# Patient Record
Sex: MALE | Race: WHITE | Employment: FULL TIME | URBAN - METROPOLITAN AREA
[De-identification: names, ages, dates, MRNs, and addresses within clinical notes are randomized per-mention and may not be internally consistent; named-entity substitution may affect disease eponyms.]

---

## 2024-07-12 ENCOUNTER — OFFICE VISIT (OUTPATIENT)
Dept: PRIMARY CARE | Facility: CLINIC | Age: 34
End: 2024-07-12
Payer: COMMERCIAL

## 2024-07-12 VITALS
SYSTOLIC BLOOD PRESSURE: 150 MMHG | OXYGEN SATURATION: 98 % | WEIGHT: 282 LBS | DIASTOLIC BLOOD PRESSURE: 90 MMHG | HEART RATE: 104 BPM | TEMPERATURE: 97 F | BODY MASS INDEX: 35.25 KG/M2

## 2024-07-12 DIAGNOSIS — G25.0 FAMILIAL TREMOR: ICD-10-CM

## 2024-07-12 DIAGNOSIS — F11.20 OPIOID TYPE DEPENDENCE, CONTINUOUS (MULTI): ICD-10-CM

## 2024-07-12 DIAGNOSIS — D22.4 MELANOCYTIC NEVI OF SCALP AND NECK: ICD-10-CM

## 2024-07-12 DIAGNOSIS — G43.109 MIGRAINE WITH AURA AND WITHOUT STATUS MIGRAINOSUS, NOT INTRACTABLE: ICD-10-CM

## 2024-07-12 DIAGNOSIS — N50.89 TESTICULAR SWELLING, RIGHT: Primary | ICD-10-CM

## 2024-07-12 DIAGNOSIS — Z00.00 HEALTHCARE MAINTENANCE: ICD-10-CM

## 2024-07-12 PROBLEM — F17.200 CURRENT SMOKER: Status: RESOLVED | Noted: 2018-05-10 | Resolved: 2024-07-12

## 2024-07-12 PROCEDURE — 99204 OFFICE O/P NEW MOD 45 MIN: CPT | Performed by: STUDENT IN AN ORGANIZED HEALTH CARE EDUCATION/TRAINING PROGRAM

## 2024-07-12 RX ORDER — BUPRENORPHINE AND NALOXONE 8; 2 MG/1; MG/1
1 FILM, SOLUBLE BUCCAL; SUBLINGUAL DAILY
COMMUNITY

## 2024-07-12 ASSESSMENT — ENCOUNTER SYMPTOMS
HEMATURIA: 0
CHILLS: 0
DIZZINESS: 0
SHORTNESS OF BREATH: 0
DIFFICULTY URINATING: 0
VOMITING: 0
FREQUENCY: 0
NAUSEA: 0
FLANK PAIN: 0
FEVER: 0
DYSURIA: 0
CONSTIPATION: 0
ABDOMINAL PAIN: 0
LIGHT-HEADEDNESS: 0
COUGH: 0
FATIGUE: 0
HEADACHES: 0
DIARRHEA: 0

## 2024-07-12 NOTE — PATIENT INSTRUCTIONS
Thank you for coming in and getting established.    With some of the symptoms you have been having, I went ahead and ordered an ultrasound of the scrotum with Dopplers.  This can also check on the blood flow as well as to make sure there are no masses or any other explanation for some of the swelling you have been having.  You should be getting a call to get this scheduled.  If you are having issues, please call our office back and we can assist with getting scheduled for this.  They can perform the ultrasound downstairs and the lab but this would need to get scheduled.    I also submitted orders for lab work.  Please get this done prior to your next appointment with me at least a week or 2 ahead of time.  Labs will need to be fasting for about 10 to 12 hours before.  There is a lab located downstairs on the first floor and they are open from about 6:30 AM to about 4:30 PM.    We also can be trying to get you in a referral to dermatology.  Please give us a call if you do not hear from their office over the next 1 to 2 weeks.  I am also going to have you get really established with neurology as well.  We will try having you see a different neurologist than the one you had previously seen.  Please call again in the next 1 to 2 weeks if you do not hear from their office about getting set up for an appointment.    You can get scheduled for a wellness visit with me before you leave the office here today.    Please call with any additional questions or concerns.    Thank you

## 2024-07-12 NOTE — PROGRESS NOTES
Subjective   Patient ID: Guillermo Hatch is a 33 y.o. male who presents for Groin Swelling (X Feb).    HPI     He has had an issue with his right testicle being bigger or looking differently than his left side.  No pain in the testicle itself. No other urinary symptoms.  The swelling seems to come and go.  No urinary changes. No rash or other lesions in the area or discharge.  No precipitating event that he is aware of.    He does have white coat hypertension.  His blood pressure and heart rate at home are both normal.    Review of Systems   Constitutional:  Negative for chills, fatigue and fever.   Respiratory:  Negative for cough and shortness of breath.    Cardiovascular:  Negative for chest pain.   Gastrointestinal:  Negative for abdominal pain, constipation, diarrhea, nausea and vomiting.   Genitourinary:  Positive for scrotal swelling (right). Negative for decreased urine volume, difficulty urinating, dysuria, enuresis, flank pain, frequency, genital sores, hematuria, penile discharge, penile pain, penile swelling, testicular pain and urgency.   Neurological:  Negative for dizziness, light-headedness and headaches.       Objective   /90   Pulse 104   Temp 36.1 °C (97 °F)   Wt 128 kg (282 lb)   SpO2 98%   BMI 35.25 kg/m²     Physical Exam  Vitals and nursing note reviewed.   Constitutional:       General: He is not in acute distress.     Appearance: Normal appearance. He is normal weight. He is not ill-appearing or toxic-appearing.   HENT:      Head: Normocephalic and atraumatic.   Cardiovascular:      Rate and Rhythm: Normal rate and regular rhythm.      Heart sounds: Normal heart sounds.   Pulmonary:      Effort: Pulmonary effort is normal.      Breath sounds: Normal breath sounds.   Genitourinary:     Penis: Normal.       Testes: Normal.   Neurological:      Mental Status: He is alert.         Assessment/Plan   Problem List Items Addressed This Visit             ICD-10-CM    Opioid type  dependence, continuous (Multi) F11.20     Follows with Reji Wing for regular Suboxone. Overall doing well.         Migraine with aura and without status migrainosus, not intractable G43.109    Relevant Orders    CBC and Auto Differential    Comprehensive Metabolic Panel    Lipid Panel    TSH with reflex to Free T4 if abnormal    Urinalysis with Reflex Microscopic    Vitamin D 25-Hydroxy,Total (for eval of Vitamin D levels)    Referral to Neurology    Familial tremor G25.0    Relevant Orders    CBC and Auto Differential    Comprehensive Metabolic Panel    TSH with reflex to Free T4 if abnormal    Referral to Neurology    Melanocytic nevi of scalp and neck D22.4    Relevant Orders    Referral to Dermatology     Other Visit Diagnoses         Codes    Testicular swelling, right    -  Primary N50.89    Relevant Orders    US scrotum w doppler    Healthcare maintenance     Z00.00    Relevant Orders    CBC and Auto Differential    Comprehensive Metabolic Panel    Lipid Panel    TSH with reflex to Free T4 if abnormal    Urinalysis with Reflex Microscopic    Vitamin D 25-Hydroxy,Total (for eval of Vitamin D levels)          History and physical examination as above.  Patient presenting to establish care.  Physical examination otherwise unremarkable.  Patient is seeming to have swelling within the right testicle.  Discussed options for further follow-up with the patient.  He would like to move forward with imaging.  Ultrasound of the scrotum with Dopplers ordered and we will contact the patient with the results.  Orders for lab work submitted for the patient.  He will get this done fasting prior to his next ointment.  We will go over the results further at that time.  Referral placed over to dermatology with his history of multiple moles.  He would like regular skin checks.  Referral also placed to neurology for persistent and previous history of recurring headaches and migraines.  Plan for wellness care overall  in the next month or 2.  Patient will call sooner with other acute concerns or complaints.

## 2024-10-10 ENCOUNTER — APPOINTMENT (OUTPATIENT)
Dept: PRIMARY CARE | Facility: CLINIC | Age: 34
End: 2024-10-10
Payer: COMMERCIAL

## 2024-11-11 ENCOUNTER — APPOINTMENT (OUTPATIENT)
Dept: PRIMARY CARE | Facility: CLINIC | Age: 34
End: 2024-11-11
Payer: COMMERCIAL

## 2024-11-11 VITALS
DIASTOLIC BLOOD PRESSURE: 82 MMHG | WEIGHT: 280 LBS | BODY MASS INDEX: 34.1 KG/M2 | HEART RATE: 92 BPM | TEMPERATURE: 97.1 F | SYSTOLIC BLOOD PRESSURE: 142 MMHG | HEIGHT: 76 IN | OXYGEN SATURATION: 99 %

## 2024-11-11 DIAGNOSIS — F11.20 OPIOID TYPE DEPENDENCE, CONTINUOUS (MULTI): ICD-10-CM

## 2024-11-11 DIAGNOSIS — K76.0 STEATOSIS OF LIVER: ICD-10-CM

## 2024-11-11 DIAGNOSIS — G25.0 FAMILIAL TREMOR: ICD-10-CM

## 2024-11-11 DIAGNOSIS — Z00.00 ENCOUNTER FOR ROUTINE HISTORY AND PHYSICAL EXAM FOR MALE: Primary | ICD-10-CM

## 2024-11-11 DIAGNOSIS — R06.83 LOUD SNORING: ICD-10-CM

## 2024-11-11 DIAGNOSIS — G43.109 MIGRAINE WITH AURA AND WITHOUT STATUS MIGRAINOSUS, NOT INTRACTABLE: ICD-10-CM

## 2024-11-11 DIAGNOSIS — N50.89 TESTICLE SWELLING: ICD-10-CM

## 2024-11-11 DIAGNOSIS — D22.60 MELANOCYTIC NEVI OF UNSPECIFIED UPPER LIMB, INCLUDING SHOULDER: ICD-10-CM

## 2024-11-11 DIAGNOSIS — R79.89 ABNORMAL LIVER FUNCTION TESTS: ICD-10-CM

## 2024-11-11 PROBLEM — D22.4 MELANOCYTIC NEVI OF SCALP AND NECK: Status: RESOLVED | Noted: 2019-03-20 | Resolved: 2024-11-11

## 2024-11-11 PROBLEM — F41.1 GENERALIZED ANXIETY DISORDER: Status: ACTIVE | Noted: 2017-01-31

## 2024-11-11 PROCEDURE — 99395 PREV VISIT EST AGE 18-39: CPT | Performed by: FAMILY MEDICINE

## 2024-11-11 PROCEDURE — 93000 ELECTROCARDIOGRAM COMPLETE: CPT | Performed by: FAMILY MEDICINE

## 2024-11-11 PROCEDURE — 3008F BODY MASS INDEX DOCD: CPT | Performed by: FAMILY MEDICINE

## 2024-11-11 PROCEDURE — 1036F TOBACCO NON-USER: CPT | Performed by: FAMILY MEDICINE

## 2024-11-11 ASSESSMENT — PATIENT HEALTH QUESTIONNAIRE - PHQ9
10. IF YOU CHECKED OFF ANY PROBLEMS, HOW DIFFICULT HAVE THESE PROBLEMS MADE IT FOR YOU TO DO YOUR WORK, TAKE CARE OF THINGS AT HOME, OR GET ALONG WITH OTHER PEOPLE: NOT DIFFICULT AT ALL
2. FEELING DOWN, DEPRESSED OR HOPELESS: NOT AT ALL
SUM OF ALL RESPONSES TO PHQ9 QUESTIONS 1 AND 2: 0
1. LITTLE INTEREST OR PLEASURE IN DOING THINGS: NOT AT ALL

## 2024-11-11 ASSESSMENT — ENCOUNTER SYMPTOMS
STRIDOR: 0
FACIAL ASYMMETRY: 0
ANAL BLEEDING: 0
ABDOMINAL PAIN: 0
DYSPHORIC MOOD: 0
CONFUSION: 0
POLYDIPSIA: 0
LIGHT-HEADEDNESS: 0
EYE DISCHARGE: 0
SINUS PAIN: 0
SEIZURES: 0
COUGH: 0
DECREASED CONCENTRATION: 0
GASTROINTESTINAL NEGATIVE: 1
TREMORS: 1
EYE ITCHING: 0
AGITATION: 0
HEMATURIA: 0
EYE PAIN: 0
OCCASIONAL FEELINGS OF UNSTEADINESS: 0
SINUS PRESSURE: 0
NAUSEA: 0
DYSURIA: 0
BRUISES/BLEEDS EASILY: 0
ACTIVITY CHANGE: 0
FATIGUE: 0
CARDIOVASCULAR NEGATIVE: 1
COLOR CHANGE: 0
VOMITING: 0
DEPRESSION: 0
CONSTIPATION: 0
CHEST TIGHTNESS: 0
DIZZINESS: 0
BLOOD IN STOOL: 0
DIFFICULTY URINATING: 0
MYALGIAS: 0
WOUND: 0
NUMBNESS: 0
ABDOMINAL DISTENTION: 0
VOICE CHANGE: 0
HEADACHES: 0
CHOKING: 0
LOSS OF SENSATION IN FEET: 0
TROUBLE SWALLOWING: 0
DIAPHORESIS: 0
EYE REDNESS: 0
PALPITATIONS: 0
SPEECH DIFFICULTY: 0
FLANK PAIN: 0
SORE THROAT: 0
HYPERACTIVE: 0
RHINORRHEA: 0
CHILLS: 0
UNEXPECTED WEIGHT CHANGE: 0
BACK PAIN: 0
DIARRHEA: 0
FREQUENCY: 0
SLEEP DISTURBANCE: 0
ARTHRALGIAS: 0
RESPIRATORY NEGATIVE: 1
ADENOPATHY: 0
APPETITE CHANGE: 0

## 2024-11-11 ASSESSMENT — COLUMBIA-SUICIDE SEVERITY RATING SCALE - C-SSRS
2. HAVE YOU ACTUALLY HAD ANY THOUGHTS OF KILLING YOURSELF?: NO
6. HAVE YOU EVER DONE ANYTHING, STARTED TO DO ANYTHING, OR PREPARED TO DO ANYTHING TO END YOUR LIFE?: NO
1. IN THE PAST MONTH, HAVE YOU WISHED YOU WERE DEAD OR WISHED YOU COULD GO TO SLEEP AND NOT WAKE UP?: NO

## 2024-11-11 NOTE — PATIENT INSTRUCTIONS
Congratulations on staying substance free.    May benefit from beta-blocker therapy recommend discussing this with your neurologist.  This might help with anxiety might help with the heart rate and blood pressure.    Going to asked you to bring in her blood pressure cuff from home to check it against ours.  Findings in office today are likely associated with whitecoat phenomenon.    Checking sleep study because of heavy snoring.    EKG performed and reviewed.    Labs have been ordered please have these performed.    Ultrasound of testicles performed because of swelling that you have noticed on the right side.    Will await the lab studies and make recommendations after that.  Once again, would like for you to bring in your blood pressure cuff to check it against ours

## 2024-11-11 NOTE — PROGRESS NOTES
Subjective   Patient ID: Guillermo Hatch is a 33 y.o. male who presents for Annual Exam.    Patient presents for physical exam.    Patient has been told he has heavy snoring feels like he sometimes will wake up at night with some shortness of breath and then it gets better.  Concerned about possible sleep apnea.  Patient continues with with neurology.    Patient is weaning off medicine for his opioid dependence.  States he has been great he is 8 years without opioids.    Patient around other people in social situations visiting doctors usually the heart rate will go up and blood pressure will elevate.  At home is pulse rate is about 80 and blood pressure readings are in the 130s over low 80s to 85.    He has had no troubles with migraines at this time has had no troubles with double vision or blurring vision.  Recently evaluated for for some swelling the right testicle had an ultrasound ordered but he did not follow through with it he states that things feel actually better at this time.    Patient had no troubles with chest pain or shortness of breath.  He is exercising twice weekly.  Trying to follow dietary guidelines closely.  He does have history of hepatic steatosis    75-90.  135/85.         Alcohol intake: none  Caffeine intake: none  Exercise: hiking twice a week.  Planks.    Last Colonoscopy: N/A  Last Pap smear: N/A  Mammogram:N/A  Last Dexa scan:N/A    Shingles vaccine: N/A  TdaP vaccine:     Review of Systems   Constitutional:  Negative for activity change, appetite change, chills, diaphoresis, fatigue and unexpected weight change.   HENT: Negative.  Negative for congestion, dental problem, ear discharge, ear pain, hearing loss, mouth sores, nosebleeds, postnasal drip, rhinorrhea, sinus pressure, sinus pain, sore throat, tinnitus, trouble swallowing and voice change.    Eyes:  Negative for pain, discharge, redness, itching and visual disturbance.        Seeing eye specilaist   Respiratory: Negative.   "Negative for cough, choking, chest tightness and stridor.         Heavy snoring    Cardiovascular: Negative.  Negative for chest pain, palpitations and leg swelling.   Gastrointestinal: Negative.  Negative for abdominal distention, abdominal pain, anal bleeding, blood in stool, constipation, diarrhea, nausea and vomiting.   Endocrine: Negative for cold intolerance, heat intolerance, polydipsia and polyuria.   Genitourinary:  Negative for difficulty urinating, dysuria, enuresis, flank pain, frequency, hematuria, penile pain, scrotal swelling and testicular pain.        Has noted some swelling of the right testicle   Musculoskeletal:  Negative for arthralgias, back pain and myalgias.   Skin:  Negative for color change, rash and wound.   Allergic/Immunologic: Negative for environmental allergies, food allergies and immunocompromised state.   Neurological:  Positive for tremors. Negative for dizziness, seizures, facial asymmetry, speech difficulty, light-headedness, numbness and headaches.   Hematological:  Negative for adenopathy. Does not bruise/bleed easily.   Psychiatric/Behavioral:  Negative for agitation, behavioral problems, confusion, decreased concentration, dysphoric mood, self-injury, sleep disturbance and suicidal ideas. The patient is not hyperactive. Nervous/anxious: seeing counselorstable.       Objective   /82   Pulse (!) 114   Temp 36.2 °C (97.1 °F)   Ht 1.918 m (6' 3.5\")   Wt 127 kg (280 lb)   SpO2 99%   BMI 34.54 kg/m²   BSA Body surface area is 2.6 meters squared.      Physical Exam  Constitutional:       General: He is not in acute distress.     Appearance: Normal appearance. He is not ill-appearing or toxic-appearing.   HENT:      Head: Normocephalic.      Right Ear: Tympanic membrane normal.      Left Ear: Tympanic membrane normal.      Nose: Nose normal.      Mouth/Throat:      Mouth: Mucous membranes are dry.   Eyes:      Extraocular Movements: Extraocular movements intact.      " Conjunctiva/sclera: Conjunctivae normal.      Pupils: Pupils are equal, round, and reactive to light.   Cardiovascular:      Rate and Rhythm: Regular rhythm. Tachycardia present.      Pulses: Normal pulses.      Heart sounds: Normal heart sounds.   Pulmonary:      Effort: Pulmonary effort is normal.      Breath sounds: Normal breath sounds. No wheezing or rhonchi.   Abdominal:      General: Abdomen is flat. Bowel sounds are normal. There is no distension.      Palpations: Abdomen is soft.      Tenderness: There is no abdominal tenderness. There is no right CVA tenderness or rebound.      Hernia: No hernia is present.   Genitourinary:     Penis: Normal.    Musculoskeletal:         General: Normal range of motion.      Cervical back: Normal range of motion. No rigidity.      Right lower leg: No edema.   Skin:     General: Skin is warm and dry.      Capillary Refill: Capillary refill takes less than 2 seconds.      Findings: No bruising.   Neurological:      General: No focal deficit present.      Mental Status: He is alert and oriented to person, place, and time.      Cranial Nerves: No cranial nerve deficit.      Sensory: No sensory deficit.      Motor: No weakness.      Coordination: Coordination normal.      Gait: Gait normal.      Deep Tendon Reflexes: Reflexes normal.      Comments: Fine tremor noted   Psychiatric:         Mood and Affect: Mood normal.         Behavior: Behavior normal.         Thought Content: Thought content normal.       No visits with results within 1 Year(s) from this visit.   Latest known visit with results is:   [facility] Encounter on 12/30/2020   Component Date Value Ref Range Status    Vitamin B-12 12/30/2020 669  211 - 911 pg/mL Final    TSH 12/30/2020 3.19  0.44 - 3.98 mIU/L Final    Comment:  TSH testing is performed using different testing    methodology at [facility] than at other    [facility] hospitals. Direct result comparisons should    only be made within the same  method.      Glucose 12/30/2020 78  74 - 99 mg/dL Final    Sodium 12/30/2020 141  136 - 145 mmol/L Final    Potassium 12/30/2020 4.1  3.5 - 5.3 mmol/L Final    Chloride 12/30/2020 104  98 - 107 mmol/L Final    Bicarbonate 12/30/2020 26  21 - 32 mmol/L Final    Anion Gap 12/30/2020 15  10 - 20 mmol/L Final    Urea Nitrogen 12/30/2020 14  6 - 23 mg/dL Final    Creatinine 12/30/2020 0.71  0.50 - 1.30 mg/dL Final    GLOMERULAR FILTRATION RATE-NON AFR* 12/30/2020 >60  >60 mL/min/1.73m2 Final    GLOMERULAR FILTRATION RATE-* 12/30/2020 >60  >60 mL/min/1.73m2 Final    Comment:  CALCULATIONS OF ESTIMATED GFR ARE PERFORMED   USING THE MDRD STUDY EQUATION FOR THE   IDMS-TRACEABLE CREATININE METHODS.   CLIN CHEM 2007;53:766-72      Calcium 12/30/2020 9.9  8.6 - 10.6 mg/dL Final    Albumin 12/30/2020 4.7  3.4 - 5.0 g/dL Final    Alkaline Phosphatase 12/30/2020 63  33 - 120 U/L Final    Total Protein 12/30/2020 8.1  6.4 - 8.2 g/dL Final    AST 12/30/2020 26  9 - 39 U/L Final    Total Bilirubin 12/30/2020 0.6  0.0 - 1.2 mg/dL Final    ALT (SGPT) 12/30/2020 38  10 - 52 U/L Final    Comment:  Patients treated with Sulfasalazine may generate    falsely decreased results for ALT.      Cholesterol 12/30/2020 197  0 - 199 mg/dL Final    Comment: .      AGE      DESIRABLE   BORDERLINE HIGH   HIGH     0-19 Y     0 - 169       170 - 199     >/= 200    20-24 Y     0 - 189       190 - 224     >/= 225         >24 Y     0 - 199       200 - 239     >/= 240   **All ranges are based on fasting samples. Specific   therapeutic targets will vary based on patient-specific   cardiac risk.  .   Pediatric guidelines reference:Pediatrics 2011, 128(S5).   Adult guidelines reference: NCEP ATPIII Guidelines,     HERMELINDO 2001, 258:2486-97  .   Venipuncture immediately after or during the    administration of Metamizole may lead to falsely   low results. Testing should be performed immediately   prior to Metamizole dosing.      HDL 12/30/2020 39.8 (A)   mg/dL Final    Comment: .      AGE      VERY LOW   LOW     NORMAL    HIGH       0-19 Y       < 35   < 40     40-45     ----    20-24 Y       ----   < 40       >45     ----      >24 Y       ----   < 40     40-60      >60  .      Cholesterol/HDL Ratio 12/30/2020 4.9   Final    Comment: REF VALUES  DESIRABLE  < 3.4  HIGH RISK  > 5.0      LDL 12/30/2020 126 (H)  0 - 99 mg/dL Final    Comment: .                           NEAR      BORD      AGE      DESIRABLE  OPTIMAL    HIGH     HIGH     VERY HIGH     0-19 Y     0 - 109     ---    110-129   >/= 130     ----    20-24 Y     0 - 119     ---    120-159   >/= 160     ----      >24 Y     0 -  99   100-129  130-159   160-189     >/=190  .      VLDL 12/30/2020 31  0 - 40 mg/dL Final    Triglycerides 12/30/2020 157 (H)  0 - 149 mg/dL Final    Comment: .      AGE      DESIRABLE   BORDERLINE HIGH   HIGH     VERY HIGH   0 D-90 D    19 - 174         ----         ----        ----  91 D- 9 Y     0 -  74        75 -  99     >/= 100      ----    10-19 Y     0 -  89        90 - 129     >/= 130      ----    20-24 Y     0 - 114       115 - 149     >/= 150      ----         >24 Y     0 - 149       150 - 199    200- 499    >/= 500  .   Venipuncture immediately after or during the    administration of Metamizole may lead to falsely   low results. Testing should be performed immediately   prior to Metamizole dosing.       Current Outpatient Medications on File Prior to Visit   Medication Sig Dispense Refill    buprenorphine-naloxone (Suboxone) 8-2 mg SL film Place 1 Film under the tongue once daily.       No current facility-administered medications on file prior to visit.     No images are attached to the encounter.            Assessment/Plan   Problem List Items Addressed This Visit             ICD-10-CM    Opioid type dependence, continuous (Multi) F11.20     Graduations on staying substance free.  Remains on Suboxone therapy         Migraine with aura and without status migrainosus, not  intractable G43.109    Familial tremor G25.0    Melanocytic nevi of unspecified upper limb, including shoulder D22.60     Following up with dermatology regularly         Steatosis of liver K76.0     Lab studies being performed may benefit from FibroScan         Abnormal liver function tests R79.89     Checking liver function test.  May benefit from doing FibroScan         Testicle swelling N50.89     Checking ultrasound of testicle         Encounter for routine history and physical exam for male - Primary Z00.00    Loud snoring R06.83     Going to do sleep study

## 2024-11-18 ENCOUNTER — APPOINTMENT (OUTPATIENT)
Dept: PRIMARY CARE | Facility: CLINIC | Age: 34
End: 2024-11-18
Payer: COMMERCIAL

## 2024-11-18 ENCOUNTER — CLINICAL SUPPORT (OUTPATIENT)
Dept: PRIMARY CARE | Facility: CLINIC | Age: 34
End: 2024-11-18
Payer: COMMERCIAL

## 2024-11-18 ENCOUNTER — OFFICE VISIT (OUTPATIENT)
Dept: PRIMARY CARE | Facility: CLINIC | Age: 34
End: 2024-11-18
Payer: COMMERCIAL

## 2024-11-18 ENCOUNTER — TELEPHONE (OUTPATIENT)
Dept: PRIMARY CARE | Facility: CLINIC | Age: 34
End: 2024-11-18

## 2024-11-18 VITALS — HEART RATE: 118 BPM | SYSTOLIC BLOOD PRESSURE: 172 MMHG | DIASTOLIC BLOOD PRESSURE: 102 MMHG

## 2024-11-18 VITALS — DIASTOLIC BLOOD PRESSURE: 102 MMHG | SYSTOLIC BLOOD PRESSURE: 172 MMHG

## 2024-11-18 DIAGNOSIS — I10 HYPERTENSION, UNSPECIFIED TYPE: Primary | ICD-10-CM

## 2024-11-18 DIAGNOSIS — I10 HYPERTENSION, UNSPECIFIED TYPE: ICD-10-CM

## 2024-11-18 DIAGNOSIS — G25.0 FAMILIAL TREMOR: Primary | ICD-10-CM

## 2024-11-18 DIAGNOSIS — F41.1 GENERALIZED ANXIETY DISORDER: ICD-10-CM

## 2024-11-18 PROCEDURE — 3077F SYST BP >= 140 MM HG: CPT | Performed by: FAMILY MEDICINE

## 2024-11-18 PROCEDURE — 3080F DIAST BP >= 90 MM HG: CPT | Performed by: FAMILY MEDICINE

## 2024-11-18 PROCEDURE — 99214 OFFICE O/P EST MOD 30 MIN: CPT | Performed by: FAMILY MEDICINE

## 2024-11-18 RX ORDER — METOPROLOL SUCCINATE 50 MG/1
50 TABLET, EXTENDED RELEASE ORAL DAILY
Qty: 30 TABLET | Refills: 0 | Status: SHIPPED | OUTPATIENT
Start: 2024-11-18 | End: 2025-05-17

## 2024-11-18 ASSESSMENT — ENCOUNTER SYMPTOMS
DIAPHORESIS: 0
NERVOUS/ANXIOUS: 1
TREMORS: 1
ACTIVITY CHANGE: 0

## 2024-11-18 NOTE — PROGRESS NOTES
Pt presents for BP check & to compare his machine w/ my manual reading per Dr Mosqueda. His machine read 182/120 & then he rechecked it & it was 174/124 P 120; he said it has never read that high. He said at home, it is around 135/85.  I got 180/110 P 118; Dr Mosqueda rechecked it & got 172/102.

## 2024-11-18 NOTE — TELEPHONE ENCOUNTER
Pt was here for BP check & to compare his machine w/ my manual reading per Dr Mosqueda. His machine read 182/120 & then he rechecked it & it was 174/124 P 120; he said it has never read that high. He said at home, it is around 135/85.  I got 180/110 P 118; Dr Mosqueda rechecked it & got 172/102.

## 2024-11-19 ENCOUNTER — TELEPHONE (OUTPATIENT)
Dept: PRIMARY CARE | Facility: CLINIC | Age: 34
End: 2024-11-19
Payer: COMMERCIAL

## 2024-11-19 NOTE — PROGRESS NOTES
Subjective   Patient ID: Guillermo Hatch is a 33 y.o. male who presents for Follow-up (Elevated BP).    Patient presents for follow-up on blood pressure.  States that this is a day that he has to present at work.  He has been more anxious than usual.    Patient also states when he comes to the office he is more anxious.  He brought in blood pressure reading from home which was in the 130s to 140s over 80s.    Significantly elevated in the office he has had no headache or double vision or blurring vision no sore throat no difficulty of swallowing    No swelling of the legs or feet.  No double vision no blurring vision.  Patient has tremor also has history of tachycardia.  Did not have his labs done yet but is going to get it done.         Review of Systems   Constitutional:  Negative for activity change and diaphoresis.   Neurological:  Positive for tremors.   Psychiatric/Behavioral:  The patient is nervous/anxious.        Objective   BP (!) 172/102   BSA There is no height or weight on file to calculate BSA.      Physical Exam  Constitutional:       General: He is not in acute distress.     Appearance: Normal appearance. He is not ill-appearing, toxic-appearing or diaphoretic.   HENT:      Head: Normocephalic and atraumatic.   Cardiovascular:      Rate and Rhythm: Normal rate and regular rhythm.      Heart sounds: No murmur heard.  Pulmonary:      Effort: Pulmonary effort is normal.   Abdominal:      General: Abdomen is flat.      Palpations: Abdomen is soft.   Skin:     General: Skin is warm.   Neurological:      General: No focal deficit present.      Mental Status: He is alert and oriented to person, place, and time.      Cranial Nerves: No cranial nerve deficit.      Sensory: No sensory deficit.      Coordination: Coordination normal.      Gait: Gait normal.      Comments: Tremor noted   Psychiatric:         Mood and Affect: Mood normal.         Behavior: Behavior normal.         Thought Content: Thought  content normal.       No visits with results within 1 Year(s) from this visit.   Latest known visit with results is:   Legacy Encounter on 12/30/2020   Component Date Value Ref Range Status    Vitamin B-12 12/30/2020 669  211 - 911 pg/mL Final    TSH 12/30/2020 3.19  0.44 - 3.98 mIU/L Final    Comment:  TSH testing is performed using different testing    methodology at [facility] than at other    [facility] hospitals. Direct result comparisons should    only be made within the same method.      Glucose 12/30/2020 78  74 - 99 mg/dL Final    Sodium 12/30/2020 141  136 - 145 mmol/L Final    Potassium 12/30/2020 4.1  3.5 - 5.3 mmol/L Final    Chloride 12/30/2020 104  98 - 107 mmol/L Final    Bicarbonate 12/30/2020 26  21 - 32 mmol/L Final    Anion Gap 12/30/2020 15  10 - 20 mmol/L Final    Urea Nitrogen 12/30/2020 14  6 - 23 mg/dL Final    Creatinine 12/30/2020 0.71  0.50 - 1.30 mg/dL Final    GLOMERULAR FILTRATION RATE-NON AFR* 12/30/2020 >60  >60 mL/min/1.73m2 Final    GLOMERULAR FILTRATION RATE-* 12/30/2020 >60  >60 mL/min/1.73m2 Final    Comment:  CALCULATIONS OF ESTIMATED GFR ARE PERFORMED   USING THE MDRD STUDY EQUATION FOR THE   IDMS-TRACEABLE CREATININE METHODS.   CLIN CHEM 2007;53:766-72      Calcium 12/30/2020 9.9  8.6 - 10.6 mg/dL Final    Albumin 12/30/2020 4.7  3.4 - 5.0 g/dL Final    Alkaline Phosphatase 12/30/2020 63  33 - 120 U/L Final    Total Protein 12/30/2020 8.1  6.4 - 8.2 g/dL Final    AST 12/30/2020 26  9 - 39 U/L Final    Total Bilirubin 12/30/2020 0.6  0.0 - 1.2 mg/dL Final    ALT (SGPT) 12/30/2020 38  10 - 52 U/L Final    Comment:  Patients treated with Sulfasalazine may generate    falsely decreased results for ALT.      Cholesterol 12/30/2020 197  0 - 199 mg/dL Final    Comment: .      AGE      DESIRABLE   BORDERLINE HIGH   HIGH     0-19 Y     0 - 169       170 - 199     >/= 200    20-24 Y     0 - 189       190 - 224     >/= 225         >24 Y     0 - 199       200 - 239      >/= 240   **All ranges are based on fasting samples. Specific   therapeutic targets will vary based on patient-specific   cardiac risk.  .   Pediatric guidelines reference:Pediatrics 2011, 128(S5).   Adult guidelines reference: NCEP ATPIII Guidelines,     HERMELINDO 2001, 258:2486-97  .   Venipuncture immediately after or during the    administration of Metamizole may lead to falsely   low results. Testing should be performed immediately   prior to Metamizole dosing.      HDL 12/30/2020 39.8 (A)  mg/dL Final    Comment: .      AGE      VERY LOW   LOW     NORMAL    HIGH       0-19 Y       < 35   < 40     40-45     ----    20-24 Y       ----   < 40       >45     ----      >24 Y       ----   < 40     40-60      >60  .      Cholesterol/HDL Ratio 12/30/2020 4.9   Final    Comment: REF VALUES  DESIRABLE  < 3.4  HIGH RISK  > 5.0      LDL 12/30/2020 126 (H)  0 - 99 mg/dL Final    Comment: .                           NEAR      BORD      AGE      DESIRABLE  OPTIMAL    HIGH     HIGH     VERY HIGH     0-19 Y     0 - 109     ---    110-129   >/= 130     ----    20-24 Y     0 - 119     ---    120-159   >/= 160     ----      >24 Y     0 -  99   100-129  130-159   160-189     >/=190  .      VLDL 12/30/2020 31  0 - 40 mg/dL Final    Triglycerides 12/30/2020 157 (H)  0 - 149 mg/dL Final    Comment: .      AGE      DESIRABLE   BORDERLINE HIGH   HIGH     VERY HIGH   0 D-90 D    19 - 174         ----         ----        ----  91 D- 9 Y     0 -  74        75 -  99     >/= 100      ----    10-19 Y     0 -  89        90 - 129     >/= 130      ----    20-24 Y     0 - 114       115 - 149     >/= 150      ----         >24 Y     0 - 149       150 - 199    200- 499    >/= 500  .   Venipuncture immediately after or during the    administration of Metamizole may lead to falsely   low results. Testing should be performed immediately   prior to Metamizole dosing.       Current Outpatient Medications on File Prior to Visit   Medication Sig Dispense Refill     buprenorphine-naloxone (Suboxone) 8-2 mg SL film Place 1 Film under the tongue once daily.      metoprolol succinate XL (Toprol-XL) 50 mg 24 hr tablet Take 1 tablet (50 mg) by mouth once daily. Do not crush or chew. 30 tablet 0     No current facility-administered medications on file prior to visit.     No images are attached to the encounter.            Assessment/Plan   Problem List Items Addressed This Visit             ICD-10-CM    Familial tremor - Primary G25.0     Persistent tremor         Generalized anxiety disorder F41.1     Anxiety noted         Hypertension I10     Significant elevation of blood pressure today.

## 2024-11-19 NOTE — PATIENT INSTRUCTIONS
05/12/21 8:15 AM     See documentation in the [employer]       Luis Manzano Notes were reviewed from his blood pressure diary.  Blood pressure cuff tested against ours and was consistent.    Going to start beta-blocker therapy to see if we can help with the blood pressure elevation also with tachycardia and also help with anxiety and tremor.    Will keep blood pressure diary please call tomorrow with report of your blood pressure reading would like to have follow-up recheck blood pressure in 1 week.  If the top number should stay above 150 or the bottom number above 90 on a regular basis please call and let you know

## 2024-11-19 NOTE — TELEPHONE ENCOUNTER
Pt called with BP readings:    11/18: 6:45 pm 163/98   Pulse 107             9:45 pm 130/92   Pulse 79    11/19:  7:30 am  148/95 Pulse 77    Pt started taking new medication last evening

## 2024-11-21 ENCOUNTER — LAB (OUTPATIENT)
Dept: LAB | Facility: LAB | Age: 34
End: 2024-11-21
Payer: COMMERCIAL

## 2024-11-21 DIAGNOSIS — G25.0 FAMILIAL TREMOR: ICD-10-CM

## 2024-11-21 DIAGNOSIS — Z00.00 HEALTHCARE MAINTENANCE: ICD-10-CM

## 2024-11-21 DIAGNOSIS — G43.109 MIGRAINE WITH AURA AND WITHOUT STATUS MIGRAINOSUS, NOT INTRACTABLE: ICD-10-CM

## 2024-11-21 LAB
25(OH)D3 SERPL-MCNC: 29 NG/ML (ref 30–100)
ALBUMIN SERPL BCP-MCNC: 4.8 G/DL (ref 3.4–5)
ALP SERPL-CCNC: 62 U/L (ref 33–120)
ALT SERPL W P-5'-P-CCNC: 57 U/L (ref 10–52)
ANION GAP SERPL CALC-SCNC: 17 MMOL/L (ref 10–20)
AST SERPL W P-5'-P-CCNC: 28 U/L (ref 9–39)
BASOPHILS # BLD AUTO: 0.07 X10*3/UL (ref 0–0.1)
BASOPHILS NFR BLD AUTO: 0.9 %
BILIRUB SERPL-MCNC: 0.4 MG/DL (ref 0–1.2)
BUN SERPL-MCNC: 13 MG/DL (ref 6–23)
CALCIUM SERPL-MCNC: 9.4 MG/DL (ref 8.6–10.6)
CHLORIDE SERPL-SCNC: 99 MMOL/L (ref 98–107)
CHOLEST SERPL-MCNC: 231 MG/DL (ref 0–199)
CHOLESTEROL/HDL RATIO: 5.1
CO2 SERPL-SCNC: 27 MMOL/L (ref 21–32)
CREAT SERPL-MCNC: 0.75 MG/DL (ref 0.5–1.3)
EGFRCR SERPLBLD CKD-EPI 2021: >90 ML/MIN/1.73M*2
EOSINOPHIL # BLD AUTO: 0.4 X10*3/UL (ref 0–0.7)
EOSINOPHIL NFR BLD AUTO: 4.9 %
ERYTHROCYTE [DISTWIDTH] IN BLOOD BY AUTOMATED COUNT: 11.9 % (ref 11.5–14.5)
GLUCOSE SERPL-MCNC: 83 MG/DL (ref 74–99)
HCT VFR BLD AUTO: 45.4 % (ref 41–52)
HDLC SERPL-MCNC: 44.9 MG/DL
HGB BLD-MCNC: 15.2 G/DL (ref 13.5–17.5)
IMM GRANULOCYTES # BLD AUTO: 0.03 X10*3/UL (ref 0–0.7)
IMM GRANULOCYTES NFR BLD AUTO: 0.4 % (ref 0–0.9)
LDLC SERPL CALC-MCNC: 132 MG/DL
LYMPHOCYTES # BLD AUTO: 3.05 X10*3/UL (ref 1.2–4.8)
LYMPHOCYTES NFR BLD AUTO: 37.7 %
MCH RBC QN AUTO: 29 PG (ref 26–34)
MCHC RBC AUTO-ENTMCNC: 33.5 G/DL (ref 32–36)
MCV RBC AUTO: 87 FL (ref 80–100)
MONOCYTES # BLD AUTO: 0.7 X10*3/UL (ref 0.1–1)
MONOCYTES NFR BLD AUTO: 8.7 %
NEUTROPHILS # BLD AUTO: 3.84 X10*3/UL (ref 1.2–7.7)
NEUTROPHILS NFR BLD AUTO: 47.4 %
NON HDL CHOLESTEROL: 186 MG/DL (ref 0–149)
NRBC BLD-RTO: 0 /100 WBCS (ref 0–0)
PLATELET # BLD AUTO: 445 X10*3/UL (ref 150–450)
POTASSIUM SERPL-SCNC: 4.7 MMOL/L (ref 3.5–5.3)
PROT SERPL-MCNC: 7.8 G/DL (ref 6.4–8.2)
RBC # BLD AUTO: 5.25 X10*6/UL (ref 4.5–5.9)
SODIUM SERPL-SCNC: 138 MMOL/L (ref 136–145)
TRIGL SERPL-MCNC: 270 MG/DL (ref 0–149)
TSH SERPL-ACNC: 3.06 MIU/L (ref 0.44–3.98)
VLDL: 54 MG/DL (ref 0–40)
WBC # BLD AUTO: 8.1 X10*3/UL (ref 4.4–11.3)

## 2024-11-21 PROCEDURE — 84443 ASSAY THYROID STIM HORMONE: CPT

## 2024-11-21 PROCEDURE — 85025 COMPLETE CBC W/AUTO DIFF WBC: CPT

## 2024-11-21 PROCEDURE — 80053 COMPREHEN METABOLIC PANEL: CPT

## 2024-11-21 PROCEDURE — 36415 COLL VENOUS BLD VENIPUNCTURE: CPT

## 2024-11-21 PROCEDURE — 80061 LIPID PANEL: CPT

## 2024-11-21 PROCEDURE — 82306 VITAMIN D 25 HYDROXY: CPT

## 2024-12-08 DIAGNOSIS — I10 HYPERTENSION, UNSPECIFIED TYPE: ICD-10-CM

## 2024-12-09 RX ORDER — METOPROLOL SUCCINATE 50 MG/1
50 TABLET, EXTENDED RELEASE ORAL DAILY
Qty: 30 TABLET | Refills: 0 | Status: SHIPPED | OUTPATIENT
Start: 2024-12-09 | End: 2025-01-08

## 2024-12-10 ENCOUNTER — TELEPHONE (OUTPATIENT)
Dept: PRIMARY CARE | Facility: CLINIC | Age: 34
End: 2024-12-10
Payer: COMMERCIAL

## 2024-12-10 NOTE — TELEPHONE ENCOUNTER
I called pt to schedule a follow up for blood pressure it can be scheduled within 1 to 2 months. Pt did not answer so I left a voicemail.

## 2024-12-12 ENCOUNTER — TELEPHONE (OUTPATIENT)
Dept: PRIMARY CARE | Facility: CLINIC | Age: 34
End: 2024-12-12
Payer: COMMERCIAL

## 2024-12-12 NOTE — TELEPHONE ENCOUNTER
[employer] called in regards to sleep study has been approved from 12/11/24 through 2/08/25  Auth #[id number]    [facility] [phone]

## 2025-01-10 ENCOUNTER — APPOINTMENT (OUTPATIENT)
Dept: PRIMARY CARE | Facility: CLINIC | Age: 35
End: 2025-01-10
Payer: COMMERCIAL

## 2025-01-10 VITALS
HEIGHT: 76 IN | WEIGHT: 292 LBS | DIASTOLIC BLOOD PRESSURE: 84 MMHG | HEART RATE: 94 BPM | SYSTOLIC BLOOD PRESSURE: 138 MMHG | BODY MASS INDEX: 35.56 KG/M2 | TEMPERATURE: 96.4 F | OXYGEN SATURATION: 98 %

## 2025-01-10 DIAGNOSIS — I10 HYPERTENSION, UNSPECIFIED TYPE: ICD-10-CM

## 2025-01-10 DIAGNOSIS — G47.39 OTHER SLEEP APNEA: Primary | ICD-10-CM

## 2025-01-10 PROCEDURE — 99213 OFFICE O/P EST LOW 20 MIN: CPT | Performed by: FAMILY MEDICINE

## 2025-01-10 PROCEDURE — 3008F BODY MASS INDEX DOCD: CPT | Performed by: FAMILY MEDICINE

## 2025-01-10 PROCEDURE — 3075F SYST BP GE 130 - 139MM HG: CPT | Performed by: FAMILY MEDICINE

## 2025-01-10 PROCEDURE — 3079F DIAST BP 80-89 MM HG: CPT | Performed by: FAMILY MEDICINE

## 2025-01-10 RX ORDER — METOPROLOL SUCCINATE 100 MG/1
100 TABLET, EXTENDED RELEASE ORAL DAILY
Qty: 90 TABLET | Refills: 0 | Status: SHIPPED | OUTPATIENT
Start: 2025-01-10 | End: 2025-04-10

## 2025-01-10 ASSESSMENT — ENCOUNTER SYMPTOMS
LOSS OF SENSATION IN FEET: 0
OCCASIONAL FEELINGS OF UNSTEADINESS: 0
CARDIOVASCULAR NEGATIVE: 1
DEPRESSION: 0
CONSTITUTIONAL NEGATIVE: 1

## 2025-01-10 ASSESSMENT — PATIENT HEALTH QUESTIONNAIRE - PHQ9
2. FEELING DOWN, DEPRESSED OR HOPELESS: NOT AT ALL
10. IF YOU CHECKED OFF ANY PROBLEMS, HOW DIFFICULT HAVE THESE PROBLEMS MADE IT FOR YOU TO DO YOUR WORK, TAKE CARE OF THINGS AT HOME, OR GET ALONG WITH OTHER PEOPLE: NOT DIFFICULT AT ALL
1. LITTLE INTEREST OR PLEASURE IN DOING THINGS: NOT AT ALL
SUM OF ALL RESPONSES TO PHQ9 QUESTIONS 1 AND 2: 0

## 2025-01-10 NOTE — PROGRESS NOTES
"Subjective   Patient ID: Guillermo Hatch is a 34 y.o. male who presents for Follow-up (BP ; review sleep study results).    Patient presents for follow-up.  Patient on Toprol XL 50 mg daily blood pressures have improved pulse rate has improved tremor also slightly improved.    He said no troubles with headache no chest pain or shortness of breath no dizziness no lightheadedness no troubles with abdominal pain or discomfort after sitting in room for prolonged period of time blood pressure has improved and pulse rate has improved         Review of Systems   Constitutional: Negative.    HENT: Negative.     Cardiovascular: Negative.        Objective   /88   Pulse (!) 120   Temp 35.8 °C (96.4 °F)   Ht 1.918 m (6' 3.5\")   Wt 132 kg (292 lb)   SpO2 98%   BMI 36.02 kg/m²   BSA Body surface area is 2.65 meters squared.      Physical Exam  HENT:      Head: Normocephalic and atraumatic.      Right Ear: Tympanic membrane normal.      Left Ear: Tympanic membrane normal.   Cardiovascular:      Rate and Rhythm: Normal rate.      Pulses: Normal pulses.   Pulmonary:      Effort: No respiratory distress.   Musculoskeletal:      Cervical back: Normal range of motion.   Neurological:      Mental Status: He is alert.      Comments: Tremor noted       Lab on 11/21/2024   Component Date Value Ref Range Status    WBC 11/21/2024 8.1  4.4 - 11.3 x10*3/uL Final    nRBC 11/21/2024 0.0  0.0 - 0.0 /100 WBCs Final    RBC 11/21/2024 5.25  4.50 - 5.90 x10*6/uL Final    Hemoglobin 11/21/2024 15.2  13.5 - 17.5 g/dL Final    Hematocrit 11/21/2024 45.4  41.0 - 52.0 % Final    MCV 11/21/2024 87  80 - 100 fL Final    MCH 11/21/2024 29.0  26.0 - 34.0 pg Final    MCHC 11/21/2024 33.5  32.0 - 36.0 g/dL Final    RDW 11/21/2024 11.9  11.5 - 14.5 % Final    Platelets 11/21/2024 445  150 - 450 x10*3/uL Final    Neutrophils % 11/21/2024 47.4  40.0 - 80.0 % Final    Immature Granulocytes %, Automated 11/21/2024 0.4  0.0 - 0.9 % Final    Immature " Granulocyte Count (IG) includes promyelocytes, myelocytes and metamyelocytes but does not include bands. Percent differential counts (%) should be interpreted in the context of the absolute cell counts (cells/UL).    Lymphocytes % 11/21/2024 37.7  13.0 - 44.0 % Final    Monocytes % 11/21/2024 8.7  2.0 - 10.0 % Final    Eosinophils % 11/21/2024 4.9  0.0 - 6.0 % Final    Basophils % 11/21/2024 0.9  0.0 - 2.0 % Final    Neutrophils Absolute 11/21/2024 3.84  1.20 - 7.70 x10*3/uL Final    Percent differential counts (%) should be interpreted in the context of the absolute cell counts (cells/uL).    Immature Granulocytes Absolute, Au* 11/21/2024 0.03  0.00 - 0.70 x10*3/uL Final    Lymphocytes Absolute 11/21/2024 3.05  1.20 - 4.80 x10*3/uL Final    Monocytes Absolute 11/21/2024 0.70  0.10 - 1.00 x10*3/uL Final    Eosinophils Absolute 11/21/2024 0.40  0.00 - 0.70 x10*3/uL Final    Basophils Absolute 11/21/2024 0.07  0.00 - 0.10 x10*3/uL Final    Glucose 11/21/2024 83  74 - 99 mg/dL Final    Sodium 11/21/2024 138  136 - 145 mmol/L Final    Potassium 11/21/2024 4.7  3.5 - 5.3 mmol/L Final    Chloride 11/21/2024 99  98 - 107 mmol/L Final    Bicarbonate 11/21/2024 27  21 - 32 mmol/L Final    Anion Gap 11/21/2024 17  10 - 20 mmol/L Final    Urea Nitrogen 11/21/2024 13  6 - 23 mg/dL Final    Creatinine 11/21/2024 0.75  0.50 - 1.30 mg/dL Final    eGFR 11/21/2024 >90  >60 mL/min/1.73m*2 Final    Calculations of estimated GFR are performed using the 2021 CKD-EPI Study Refit equation without the race variable for the IDMS-Traceable creatinine methods.  https://jasn.asnjournals.org/content/early/2021/09/22/ASN.1666703302    Calcium 11/21/2024 9.4  8.6 - 10.6 mg/dL Final    Albumin 11/21/2024 4.8  3.4 - 5.0 g/dL Final    Alkaline Phosphatase 11/21/2024 62  33 - 120 U/L Final    Total Protein 11/21/2024 7.8  6.4 - 8.2 g/dL Final    AST 11/21/2024 28  9 - 39 U/L Final    Bilirubin, Total 11/21/2024 0.4  0.0 - 1.2 mg/dL Final    ALT  11/21/2024 57 (H)  10 - 52 U/L Final    Patients treated with Sulfasalazine may generate falsely decreased results for ALT.    Cholesterol 11/21/2024 231 (H)  0 - 199 mg/dL Final          Age      Desirable   Borderline High   High     0-19 Y     0 - 169       170 - 199     >/= 200    20-24 Y     0 - 189       190 - 224     >/= 225         >24 Y     0 - 199       200 - 239     >/= 240   **All ranges are based on fasting samples. Specific   therapeutic targets will vary based on patient-specific   cardiac risk.    Pediatric guidelines reference:Pediatrics 2011, 128(S5).Adult guidelines reference: NCEP ATPIII Guidelines,HERMELINDO 2001, 258:2486-97    Venipuncture immediately after or during the administration of Metamizole may lead to falsely low results. Testing should be performed immediately prior to Metamizole dosing.    HDL-Cholesterol 11/21/2024 44.9  mg/dL Final      Age       Very Low   Low     Normal    High    0-19 Y    < 35      < 40     40-45     ----  20-24 Y    ----     < 40      >45      ----        >24 Y      ----     < 40     40-60      >60      Cholesterol/HDL Ratio 11/21/2024 5.1   Final      Ref Values  Desirable  < 3.4  High Risk  > 5.0    LDL Calculated 11/21/2024 132 (H)  <=99 mg/dL Final                                Near   Borderline      AGE      Desirable  Optimal    High     High     Very High     0-19 Y     0 - 109     ---    110-129   >/= 130     ----    20-24 Y     0 - 119     ---    120-159   >/= 160     ----      >24 Y     0 -  99   100-129  130-159   160-189     >/=190      VLDL 11/21/2024 54 (H)  0 - 40 mg/dL Final    Triglycerides 11/21/2024 270 (H)  0 - 149 mg/dL Final    Age              Desirable        Borderline         High        Very High  SEX:B           mg/dL             mg/dL               mg/dL      mg/dL  <=14D                       ----               ----        ----  15D-365D                    ----               ----        ----  1Y-9Y           0-74                75-99             >=100       ----  10Y-19Y        0-89                            >=130       ----  20Y-24Y        0-114             115-149             >=150      ----  >= 25Y         0-149             150-199             200-499    >=500      Venipuncture immediately after or during the administration of Metamizole may lead to falsely low results. Testing should be performed immediately prior to Metamizole dosing.    Non HDL Cholesterol 11/21/2024 186 (H)  0 - 149 mg/dL Final          Age       Desirable   Borderline High   High     Very High     0-19 Y     0 - 119       120 - 144     >/= 145    >/= 160    20-24 Y     0 - 149       150 - 189     >/= 190      ----         >24 Y    30 mg/dL above LDL Cholesterol goal      Thyroid Stimulating Hormone 11/21/2024 3.06  0.44 - 3.98 mIU/L Final    Vitamin D, 25-Hydroxy, Total 11/21/2024 29 (L)  30 - 100 ng/mL Final     Current Outpatient Medications on File Prior to Visit   Medication Sig Dispense Refill    buprenorphine-naloxone (Suboxone) 8-2 mg SL film Place 1 Film under the tongue once daily.      metoprolol succinate XL (Toprol-XL) 50 mg 24 hr tablet Take 1 tablet (50 mg) by mouth once daily. Do not crush or chew. 30 tablet 0     No current facility-administered medications on file prior to visit.     No images are attached to the encounter.            Assessment/Plan   Problem List Items Addressed This Visit             ICD-10-CM    Hypertension I10     Better controlled and heart rate better controlled         Relevant Medications    metoprolol succinate XL (Toprol-XL) 100 mg 24 hr tablet

## 2025-01-10 NOTE — PATIENT INSTRUCTIONS
Overall improved.    Going to increase the Toprol to 100 mg to try to control blood pressure.  Better and also control pulse rate and may help with the tremor would like to follow-up in 4 months.    Obstructive sleep apnea noted ordering CPAP titration

## 2025-01-14 ENCOUNTER — TELEPHONE (OUTPATIENT)
Dept: PRIMARY CARE | Facility: CLINIC | Age: 35
End: 2025-01-14
Payer: COMMERCIAL

## 2025-01-14 NOTE — TELEPHONE ENCOUNTER
LAURI:   Carli brooks from [facility] Medical benefits management with a prior authorization approval for Continuous positive CPAP device with [employer]     Valid Dates:   1/13/25 - 4/12/2025  [facility] # [id number]        Carli # [phone]

## 2025-03-11 ENCOUNTER — APPOINTMENT (OUTPATIENT)
Dept: PRIMARY CARE | Facility: CLINIC | Age: 35
End: 2025-03-11
Payer: COMMERCIAL

## 2025-03-11 VITALS
WEIGHT: 292 LBS | HEART RATE: 90 BPM | DIASTOLIC BLOOD PRESSURE: 80 MMHG | OXYGEN SATURATION: 99 % | BODY MASS INDEX: 35.56 KG/M2 | SYSTOLIC BLOOD PRESSURE: 138 MMHG | TEMPERATURE: 96.2 F | HEIGHT: 76 IN

## 2025-03-11 DIAGNOSIS — I10 HYPERTENSION, UNSPECIFIED TYPE: ICD-10-CM

## 2025-03-11 DIAGNOSIS — R06.83 LOUD SNORING: Primary | ICD-10-CM

## 2025-03-11 PROCEDURE — 3075F SYST BP GE 130 - 139MM HG: CPT | Performed by: FAMILY MEDICINE

## 2025-03-11 PROCEDURE — 3008F BODY MASS INDEX DOCD: CPT | Performed by: FAMILY MEDICINE

## 2025-03-11 PROCEDURE — 99213 OFFICE O/P EST LOW 20 MIN: CPT | Performed by: FAMILY MEDICINE

## 2025-03-11 PROCEDURE — 3079F DIAST BP 80-89 MM HG: CPT | Performed by: FAMILY MEDICINE

## 2025-03-11 RX ORDER — METOPROLOL SUCCINATE 100 MG/1
100 TABLET, EXTENDED RELEASE ORAL DAILY
Qty: 90 TABLET | Refills: 3 | Status: SHIPPED | OUTPATIENT
Start: 2025-03-11 | End: 2026-03-06

## 2025-03-11 ASSESSMENT — ENCOUNTER SYMPTOMS
FEVER: 0
FACIAL SWELLING: 0
DECREASED CONCENTRATION: 0
APPETITE CHANGE: 0
EYE PAIN: 0
LOSS OF SENSATION IN FEET: 0
OCCASIONAL FEELINGS OF UNSTEADINESS: 0
CHEST TIGHTNESS: 0
SHORTNESS OF BREATH: 0
DEPRESSION: 0
DIAPHORESIS: 0
EYE DISCHARGE: 0
ABDOMINAL PAIN: 0
NAUSEA: 0

## 2025-03-11 ASSESSMENT — PATIENT HEALTH QUESTIONNAIRE - PHQ9
10. IF YOU CHECKED OFF ANY PROBLEMS, HOW DIFFICULT HAVE THESE PROBLEMS MADE IT FOR YOU TO DO YOUR WORK, TAKE CARE OF THINGS AT HOME, OR GET ALONG WITH OTHER PEOPLE: NOT DIFFICULT AT ALL
1. LITTLE INTEREST OR PLEASURE IN DOING THINGS: NOT AT ALL
2. FEELING DOWN, DEPRESSED OR HOPELESS: NOT AT ALL
SUM OF ALL RESPONSES TO PHQ9 QUESTIONS 1 AND 2: 0

## 2025-03-11 NOTE — PROGRESS NOTES
"Subjective   Patient ID: Guillermo Hatch is a 34 y.o. male who presents for face to face for cpap (follow up BP).    Feel less.    Patient overall doing well.  Has been using CPAP finds that his headaches are almost completely gone.  He said no double vision no blurring vision no troubles with using it.  Sometimes he will forget to fall asleep later.    He feels more rested when he does use it.  Using the cpap.    Falling asleep well.    Some daytime sleepiness.  No troubles with chest pain or shortness of breath.  No troubles with abdominal pain    Pulse rate has been significantly improved blood pressure looks much better since being on beta-blocker therapy    Trying to sleep at night.         patient presents for follow-up on CPAP.  Patient also is here to recheck blood pressure and pulse rate.    Review of Systems   Constitutional:  Negative for appetite change, diaphoresis and fever.   HENT:  Negative for congestion, ear discharge, facial swelling and postnasal drip.    Eyes:  Negative for pain, discharge and visual disturbance.   Respiratory:  Negative for chest tightness and shortness of breath.    Cardiovascular:  Negative for leg swelling.   Gastrointestinal:  Negative for abdominal pain and nausea.   Genitourinary:  Negative for enuresis and penile discharge.   Psychiatric/Behavioral:  Negative for behavioral problems and decreased concentration.        Objective   /80   Pulse 90   Temp 35.7 °C (96.2 °F)   Ht 1.918 m (6' 3.5\")   Wt 132 kg (292 lb)   SpO2 99%   BMI 36.02 kg/m²   BSA Body surface area is 2.65 meters squared.      Physical Exam  Constitutional:       General: He is not in acute distress.     Appearance: Normal appearance. He is not ill-appearing, toxic-appearing or diaphoretic.   HENT:      Head: Normocephalic.      Right Ear: Tympanic membrane normal.      Left Ear: Tympanic membrane normal.      Mouth/Throat:      Mouth: Mucous membranes are moist.   Neck:      Vascular: No " carotid bruit.   Cardiovascular:      Rate and Rhythm: Normal rate and regular rhythm.      Pulses: Normal pulses.   Pulmonary:      Effort: Pulmonary effort is normal.      Breath sounds: Normal breath sounds.   Abdominal:      General: Abdomen is flat. There is no distension.      Palpations: There is mass.   Musculoskeletal:      Cervical back: No rigidity or tenderness.   Lymphadenopathy:      Cervical: No cervical adenopathy.   Neurological:      General: No focal deficit present.      Mental Status: He is alert.      Cranial Nerves: No cranial nerve deficit.      Comments: tremor   Psychiatric:         Mood and Affect: Mood normal.         Behavior: Behavior normal.       Lab on 11/21/2024   Component Date Value Ref Range Status    WBC 11/21/2024 8.1  4.4 - 11.3 x10*3/uL Final    nRBC 11/21/2024 0.0  0.0 - 0.0 /100 WBCs Final    RBC 11/21/2024 5.25  4.50 - 5.90 x10*6/uL Final    Hemoglobin 11/21/2024 15.2  13.5 - 17.5 g/dL Final    Hematocrit 11/21/2024 45.4  41.0 - 52.0 % Final    MCV 11/21/2024 87  80 - 100 fL Final    MCH 11/21/2024 29.0  26.0 - 34.0 pg Final    MCHC 11/21/2024 33.5  32.0 - 36.0 g/dL Final    RDW 11/21/2024 11.9  11.5 - 14.5 % Final    Platelets 11/21/2024 445  150 - 450 x10*3/uL Final    Neutrophils % 11/21/2024 47.4  40.0 - 80.0 % Final    Immature Granulocytes %, Automated 11/21/2024 0.4  0.0 - 0.9 % Final    Immature Granulocyte Count (IG) includes promyelocytes, myelocytes and metamyelocytes but does not include bands. Percent differential counts (%) should be interpreted in the context of the absolute cell counts (cells/UL).    Lymphocytes % 11/21/2024 37.7  13.0 - 44.0 % Final    Monocytes % 11/21/2024 8.7  2.0 - 10.0 % Final    Eosinophils % 11/21/2024 4.9  0.0 - 6.0 % Final    Basophils % 11/21/2024 0.9  0.0 - 2.0 % Final    Neutrophils Absolute 11/21/2024 3.84  1.20 - 7.70 x10*3/uL Final    Percent differential counts (%) should be interpreted in the context of the absolute cell  counts (cells/uL).    Immature Granulocytes Absolute, Au* 11/21/2024 0.03  0.00 - 0.70 x10*3/uL Final    Lymphocytes Absolute 11/21/2024 3.05  1.20 - 4.80 x10*3/uL Final    Monocytes Absolute 11/21/2024 0.70  0.10 - 1.00 x10*3/uL Final    Eosinophils Absolute 11/21/2024 0.40  0.00 - 0.70 x10*3/uL Final    Basophils Absolute 11/21/2024 0.07  0.00 - 0.10 x10*3/uL Final    Glucose 11/21/2024 83  74 - 99 mg/dL Final    Sodium 11/21/2024 138  136 - 145 mmol/L Final    Potassium 11/21/2024 4.7  3.5 - 5.3 mmol/L Final    Chloride 11/21/2024 99  98 - 107 mmol/L Final    Bicarbonate 11/21/2024 27  21 - 32 mmol/L Final    Anion Gap 11/21/2024 17  10 - 20 mmol/L Final    Urea Nitrogen 11/21/2024 13  6 - 23 mg/dL Final    Creatinine 11/21/2024 0.75  0.50 - 1.30 mg/dL Final    eGFR 11/21/2024 >90  >60 mL/min/1.73m*2 Final    Calculations of estimated GFR are performed using the 2021 CKD-EPI Study Refit equation without the race variable for the IDMS-Traceable creatinine methods.  https://jasn.asnjournals.org/content/early/2021/09/22/ASN.2572093000    Calcium 11/21/2024 9.4  8.6 - 10.6 mg/dL Final    Albumin 11/21/2024 4.8  3.4 - 5.0 g/dL Final    Alkaline Phosphatase 11/21/2024 62  33 - 120 U/L Final    Total Protein 11/21/2024 7.8  6.4 - 8.2 g/dL Final    AST 11/21/2024 28  9 - 39 U/L Final    Bilirubin, Total 11/21/2024 0.4  0.0 - 1.2 mg/dL Final    ALT 11/21/2024 57 (H)  10 - 52 U/L Final    Patients treated with Sulfasalazine may generate falsely decreased results for ALT.    Cholesterol 11/21/2024 231 (H)  0 - 199 mg/dL Final          Age      Desirable   Borderline High   High     0-19 Y     0 - 169       170 - 199     >/= 200    20-24 Y     0 - 189       190 - 224     >/= 225         >24 Y     0 - 199       200 - 239     >/= 240   **All ranges are based on fasting samples. Specific   therapeutic targets will vary based on patient-specific   cardiac risk.    Pediatric guidelines reference:Pediatrics 2011, 128(S5).Adult  guidelines reference: NCEP ATPIII Guidelines,HERMELINDO 2001, 258:2486-97    Venipuncture immediately after or during the administration of Metamizole may lead to falsely low results. Testing should be performed immediately prior to Metamizole dosing.    HDL-Cholesterol 11/21/2024 44.9  mg/dL Final      Age       Very Low   Low     Normal    High    0-19 Y    < 35      < 40     40-45     ----  20-24 Y    ----     < 40      >45      ----        >24 Y      ----     < 40     40-60      >60      Cholesterol/HDL Ratio 11/21/2024 5.1   Final      Ref Values  Desirable  < 3.4  High Risk  > 5.0    LDL Calculated 11/21/2024 132 (H)  <=99 mg/dL Final                                Near   Borderline      AGE      Desirable  Optimal    High     High     Very High     0-19 Y     0 - 109     ---    110-129   >/= 130     ----    20-24 Y     0 - 119     ---    120-159   >/= 160     ----      >24 Y     0 -  99   100-129  130-159   160-189     >/=190      VLDL 11/21/2024 54 (H)  0 - 40 mg/dL Final    Triglycerides 11/21/2024 270 (H)  0 - 149 mg/dL Final    Age              Desirable        Borderline         High        Very High  SEX:B           mg/dL             mg/dL               mg/dL      mg/dL  <=14D                       ----               ----        ----  15D-365D                    ----               ----        ----  1Y-9Y           0-74               75-99             >=100       ----  10Y-19Y        0-89                            >=130       ----  20Y-24Y        0-114             115-149             >=150      ----  >= 25Y         0-149             150-199             200-499    >=500      Venipuncture immediately after or during the administration of Metamizole may lead to falsely low results. Testing should be performed immediately prior to Metamizole dosing.    Non HDL Cholesterol 11/21/2024 186 (H)  0 - 149 mg/dL Final          Age       Desirable   Borderline High   High     Very High     0-19 Y     0  - 119       120 - 144     >/= 145    >/= 160    20-24 Y     0 - 149       150 - 189     >/= 190      ----         >24 Y    30 mg/dL above LDL Cholesterol goal      Thyroid Stimulating Hormone 11/21/2024 3.06  0.44 - 3.98 mIU/L Final    Vitamin D, 25-Hydroxy, Total 11/21/2024 29 (L)  30 - 100 ng/mL Final     Current Outpatient Medications on File Prior to Visit   Medication Sig Dispense Refill    buprenorphine-naloxone (Suboxone) 8-2 mg SL film Place 1 Film under the tongue once daily.      metoprolol succinate XL (Toprol-XL) 100 mg 24 hr tablet Take 1 tablet (100 mg) by mouth once daily. Do not crush or chew. 90 tablet 0     No current facility-administered medications on file prior to visit.     No images are attached to the encounter.            Assessment/Plan   Problem List Items Addressed This Visit             ICD-10-CM    Loud snoring - Primary R06.83    Hypertension I10     Blood pressure much better controlled on beta-blocker therapy

## 2025-03-11 NOTE — ASSESSMENT & PLAN NOTE
Doing well.    Blood pressure much improved pulse rate also improved.    Continue with CPAP.    Would like to follow-up in 6 months

## 2025-11-13 ENCOUNTER — APPOINTMENT (OUTPATIENT)
Dept: DERMATOLOGY | Facility: CLINIC | Age: 35
End: 2025-11-13
Payer: COMMERCIAL